# Patient Record
Sex: MALE | URBAN - METROPOLITAN AREA
[De-identification: names, ages, dates, MRNs, and addresses within clinical notes are randomized per-mention and may not be internally consistent; named-entity substitution may affect disease eponyms.]

---

## 2023-03-22 ENCOUNTER — TELEPHONE (OUTPATIENT)
Dept: ENT CLINIC | Age: 43
End: 2023-03-22

## 2023-03-22 NOTE — TELEPHONE ENCOUNTER
LVM informing pt that 3/28 appt needs to be rescheduled due to Jammie Bradford being out of the office.  Asked pt to contact us ASAP

## 2023-03-24 ENCOUNTER — TELEPHONE (OUTPATIENT)
Dept: ENT CLINIC | Age: 43
End: 2023-03-24

## 2023-03-24 NOTE — TELEPHONE ENCOUNTER
LVM informing pt that his 3/28 appt has been cancelled since we were unable to reach him to reschedule the appt, due to Gurvinder Multani being out of the office.      Asked pt to contact our office as soon as possible to reschedule

## 2023-03-29 ENCOUNTER — OFFICE VISIT (OUTPATIENT)
Dept: ENT CLINIC | Age: 43
End: 2023-03-29
Payer: COMMERCIAL

## 2023-03-29 VITALS
SYSTOLIC BLOOD PRESSURE: 128 MMHG | WEIGHT: 204 LBS | DIASTOLIC BLOOD PRESSURE: 80 MMHG | OXYGEN SATURATION: 96 % | HEART RATE: 82 BPM | RESPIRATION RATE: 18 BRPM

## 2023-03-29 DIAGNOSIS — Z98.890 HISTORY OF NASAL SURGERY: ICD-10-CM

## 2023-03-29 DIAGNOSIS — R09.81 NASAL CONGESTION: ICD-10-CM

## 2023-03-29 DIAGNOSIS — J34.3 NASAL TURBINATE HYPERTROPHY: ICD-10-CM

## 2023-03-29 DIAGNOSIS — J34.89 NASAL OBSTRUCTION: Primary | ICD-10-CM

## 2023-03-29 DIAGNOSIS — J34.2 NASAL SEPTAL DEVIATION: ICD-10-CM

## 2023-03-29 PROCEDURE — 99203 OFFICE O/P NEW LOW 30 MIN: CPT | Performed by: OTOLARYNGOLOGY

## 2023-03-29 RX ORDER — FLUTICASONE PROPIONATE 50 MCG
2 SPRAY, SUSPENSION (ML) NASAL DAILY
Qty: 1 EACH | Refills: 1 | Status: SHIPPED | OUTPATIENT
Start: 2023-03-29

## 2023-03-29 NOTE — PROGRESS NOTES
Chief Complaint   Patient presents with    New Patient    Breathing Problem   Has has some reconstructive surgery on jaw and also roto-rooter (nasal) surgery over 10yrs ago      Visit Vitals  /80 (BP 1 Location: Left upper arm, BP Patient Position: Sitting, BP Cuff Size: Adult)   Pulse 82   Resp 18   Wt 204 lb (92.5 kg)   SpO2 96%

## 2023-03-29 NOTE — PROGRESS NOTES
Otolaryngology-Head and Neck Surgery  New Patient Visit     Patient: Erin Leon  YOB: 1980  MRN: [id number]  Date of Service: 3/29/2023    Chief Complaint:   Chief Complaint   Patient presents with    New Patient    Breathing Problem       History of Present Illness: Erin Leon is a 41y.o. year old male who presents today for discussion of nasal obstruction. He has a history of chronic nasal obstruction    As a child had multiple jaw, orthognathic surgeries  Had some sort of nasal surgery or procedure intended to help his nasal breathing    However, has bilateral nasal obstruction, generally at night    Seen by dentist recently and told mouth breathing was contributing to dental issues / dental wear     Past Medical History:  No past medical history on file. Past Surgical History:   No past surgical history on file. Medications:   No current outpatient medications    Allergies:   Not on File    Social History:         Family History:  No family history on file. Review of Systems:    Consitutional: denies fever, excessive weight gain or loss. Eyes: denies diplopia, eye pain. Integumentary: denies new concerning skin lesions. Ears, Nose, Mouth, Throat: denies except as per HPI. Endocrine: denies hot or cold intolerance, increased thirst.  Respiratory: denies cough, hemoptysis, wheezing  Gastrointestinal: denies trouble swallowing, nausea, emesis, regurgitation  Musculoskeletal: denies muscle weakness or wasting  Cardiovascular: denies chest pain, shortness of breath  Neurologic: denies seizures, numbness or tingling, syncope  Hematologic: denies easy bleeding or bruising    Physical Examination:   There were no vitals filed for this visit. General: Comfortable, pleasant, appears stated age  Voice: Strong, speaking in full sentences, no stridor    Face: No masses or lesions, facial strength symmetric   Ears: External ears unremarkable. Bilateral ear canal clear.  Tympanic membrane clear and intact, with visible landmarks. Clear middle ear space  Nose: External nose unremarkable. Dorsum midline. Anterior rhinoscopy demonstrates no lesions. R septal deviation with diffuse nasal turbinate hypertrophy  Oral Cavity / Oropharynx: No trismus. Mucosa pink and moist. No lesions. Tongue is midline and mobile. Palate elevates symmetrically. Uvula midline. Tonsils unremarkable. Base of tongue soft. Floor of mouth soft. Neck: Supple. No adenopathy. Thyroid unremarkable. Palpable laryngeal landmarks. Full neck range of motion   Neurologic: CN II - XI intact. Normal gait      Assessment and Plan:   Nasal obstruction  History of craniofacial surgery  Nasal septal deviation  - He does have an acute URI today so that may be some of the congestion  - Add flonase daily, discussed use  - Check sinus CT given prior craniofacial surgery  - Follow up in 6 weeks  - Pending CT and exam, consider septoplasty, turbinate reduciton           The patient was instructed to return to clinic if no improvement or progression of symptoms. Signs to watch out for reviewed.       Nino Olszewski, MD   [facility]  [address] [place] [address]  Office Phone:

## 2023-05-02 ENCOUNTER — HOSPITAL ENCOUNTER (OUTPATIENT)
Dept: CT IMAGING | Age: 43
Discharge: HOME | End: 2023-05-02
Attending: OTOLARYNGOLOGY
Payer: COMMERCIAL

## 2023-05-02 DIAGNOSIS — Z98.890 HISTORY OF NASAL SURGERY: ICD-10-CM

## 2023-05-02 DIAGNOSIS — J34.89 NASAL OBSTRUCTION: ICD-10-CM

## 2023-05-02 PROCEDURE — 70486 CT MAXILLOFACIAL W/O DYE: CPT

## 2023-05-08 ENCOUNTER — OFFICE VISIT (OUTPATIENT)
Age: 43
End: 2023-05-08
Payer: COMMERCIAL

## 2023-05-08 VITALS
HEART RATE: 105 BPM | OXYGEN SATURATION: 98 % | WEIGHT: 204 LBS | SYSTOLIC BLOOD PRESSURE: 110 MMHG | DIASTOLIC BLOOD PRESSURE: 80 MMHG | BODY MASS INDEX: 27.04 KG/M2 | HEIGHT: 73 IN

## 2023-05-08 DIAGNOSIS — R09.81 NASAL CONGESTION: ICD-10-CM

## 2023-05-08 DIAGNOSIS — J34.3 HYPERTROPHY OF NASAL TURBINATES: ICD-10-CM

## 2023-05-08 DIAGNOSIS — J34.2 DEVIATED NASAL SEPTUM: Primary | ICD-10-CM

## 2023-05-08 PROCEDURE — 99213 OFFICE O/P EST LOW 20 MIN: CPT | Performed by: OTOLARYNGOLOGY

## 2023-05-08 RX ORDER — FLUTICASONE PROPIONATE 50 MCG
2 SPRAY, SUSPENSION (ML) NASAL DAILY
COMMUNITY
Start: 2023-03-29

## 2023-05-08 NOTE — PROGRESS NOTES
facial strength symmetric   Ears: External ears unremarkable. Bilateral ear canal clear. Tympanic membrane clear and intact, with visible landmarks. Clear middle ear space  Nose: External nose unremarkable. Dorsum midline. Anterior rhinoscopy demonstrates no lesions. R septal deviation with diffuse nasal turbinate hypertrophy  Oral Cavity / Oropharynx: No trismus. Mucosa pink and moist. No lesions. Tongue is midline and mobile. Palate elevates symmetrically. Uvula midline. Tonsils unremarkable. Base of tongue soft. Floor of mouth soft. Neck: Supple. No adenopathy. Thyroid unremarkable. Palpable laryngeal landmarks. Full neck range of motion   Neurologic: CN II - XI intact. Normal gait    CT sinus images reviewed with R septal deviation and high posterior R septal spur     Assessment and Plan:   Nasal obstruction  History of craniofacial surgery  Nasal septal deviation  - Reviewed CT images   - Has had partial improvement with flonase but nasal obstruction still bothersome  - Discussed role of septoplasty and turbinate reduction, which he is interested in  - He has some upcoming insurance changes so timing will be dependent on this  - RTC for preop visit, plan scope with preop visit           The patient was instructed to return to clinic if no improvement or progression of symptoms. Signs to watch out for reviewed.       MD [facility]  [address] [place] [address] [address]  [facility]  Office Phone: [phone]

## 2023-05-22 ENCOUNTER — TELEPHONE (OUTPATIENT)
Age: 43
End: 2023-05-22

## 2023-05-22 NOTE — TELEPHONE ENCOUNTER
Contacted patient to see if he would like to schedule surgery with Dr. Yared Roque (Septoplasty and turbinate reduction). Pt stated he does not wish to move forward with scheduling surgery for now. He stated he would like to wait for a few months \"maybe about 3 months or so\" before proceeding with scheduling. I asked if he would like to reach out when he was ready and he stated he would. I informed the pt I would hold onto his info for when he is ready to proceed.

## 2024-11-13 ENCOUNTER — OFFICE VISIT (OUTPATIENT)
Facility: CLINIC | Age: 44
End: 2024-11-13
Payer: COMMERCIAL

## 2024-11-13 VITALS
TEMPERATURE: 98.2 F | OXYGEN SATURATION: 97 % | HEART RATE: 69 BPM | HEIGHT: 73 IN | SYSTOLIC BLOOD PRESSURE: 138 MMHG | WEIGHT: 207 LBS | RESPIRATION RATE: 16 BRPM | BODY MASS INDEX: 27.43 KG/M2 | DIASTOLIC BLOOD PRESSURE: 80 MMHG

## 2024-11-13 DIAGNOSIS — J34.2 DEVIATED NASAL SEPTUM: ICD-10-CM

## 2024-11-13 DIAGNOSIS — Z13.1 DIABETES MELLITUS SCREENING: ICD-10-CM

## 2024-11-13 DIAGNOSIS — M72.2 PLANTAR FASCIITIS: ICD-10-CM

## 2024-11-13 DIAGNOSIS — B07.8 OTHER VIRAL WARTS: ICD-10-CM

## 2024-11-13 DIAGNOSIS — Z13.220 LIPID SCREENING: ICD-10-CM

## 2024-11-13 DIAGNOSIS — R09.81 CHRONIC NASAL CONGESTION: Primary | ICD-10-CM

## 2024-11-13 PROCEDURE — 99204 OFFICE O/P NEW MOD 45 MIN: CPT | Performed by: STUDENT IN AN ORGANIZED HEALTH CARE EDUCATION/TRAINING PROGRAM

## 2024-11-13 SDOH — ECONOMIC STABILITY: FOOD INSECURITY: WITHIN THE PAST 12 MONTHS, THE FOOD YOU BOUGHT JUST DIDN'T LAST AND YOU DIDN'T HAVE MONEY TO GET MORE.: PATIENT DECLINED

## 2024-11-13 SDOH — ECONOMIC STABILITY: FOOD INSECURITY: WITHIN THE PAST 12 MONTHS, YOU WORRIED THAT YOUR FOOD WOULD RUN OUT BEFORE YOU GOT MONEY TO BUY MORE.: PATIENT DECLINED

## 2024-11-13 SDOH — ECONOMIC STABILITY: INCOME INSECURITY: HOW HARD IS IT FOR YOU TO PAY FOR THE VERY BASICS LIKE FOOD, HOUSING, MEDICAL CARE, AND HEATING?: PATIENT DECLINED

## 2024-11-13 ASSESSMENT — PATIENT HEALTH QUESTIONNAIRE - PHQ9
SUM OF ALL RESPONSES TO PHQ QUESTIONS 1-9: 0
1. LITTLE INTEREST OR PLEASURE IN DOING THINGS: NOT AT ALL
SUM OF ALL RESPONSES TO PHQ9 QUESTIONS 1 & 2: 0
SUM OF ALL RESPONSES TO PHQ QUESTIONS 1-9: 0
2. FEELING DOWN, DEPRESSED OR HOPELESS: NOT AT ALL

## 2024-11-13 NOTE — PROGRESS NOTES
\"Have you been to the ER, urgent care clinic since your last visit?  Hospitalized since your last visit?\"    NO    “Have you seen or consulted any other health care providers outside our system since your last visit?”    NO    Chief Complaint   Patient presents with    New Patient     /80 (Site: Right Lower Arm, Position: Sitting, Cuff Size: Large Adult)   Pulse 89   Temp 98.2 °F (36.8 °C) (Temporal)   Resp 16   Ht 1.854 m (6' 1\")   Wt 93.9 kg (207 lb)   SpO2 97%   BMI 27.31 kg/m²

## 2024-11-13 NOTE — PATIENT INSTRUCTIONS
In the age category closest to yours, read across to find your target heart rates. Target heart rate during moderate-intensity activities is about 50-70% of maximum heart rate. During vigorous physical activity, it’s about 70-85% of maximum.

## 2024-11-13 NOTE — PROGRESS NOTES
[facility]  Subjective  Chief Complaint   Patient presents with    New Patient     HPI:  Eligio Del Cid  : 1980    PCP: Lisa Child DO    History of Present Illness  The patient is a 44-year-old male here today to establish care and address several acute concerns.    He has been dealing with plantar fasciitis for over a year. He has been using an insole and a compression sock on one foot, which has provided some relief. He also uses a night brace and performs stretches every three days. Despite these measures, the condition persists. He has been unable to run for over a year due to the pain. He has tried to alleviate the pain by using an elliptical machine at home and stopped running.    He has been dealing with a common wart on his right hand for six months. He has attempted to freeze it off three times without success. He has also tried icing it, but this has not led to any improvement.    He has noticed an increase in his heart rate during exercise. He is seeking guidance on how to manage this. He has been using a smart watch to monitor his heart rate. His resting heart rate has been as low as 51 or 52, but these instances were brief. His average heart rate is around 55 or 56. During exercise, his heart rate can reach between 165 and 175. He is concerned about potential cardiovascular issues.    He had some dental work which impacted his ability to breathe. He could not breathe properly through his nose as a kid. He had surgery at the end of college. He is treating that with occasional doses of Claritin and Flonase spray. He had a mole removed from his back. He had a colonoscopy which came back clean.    He says he has had cancer blood testing done but believes there was a false positive.    Not interested in blood tests    SOCIAL HISTORY  He does not smoke. He drinks alcohol once every 2 weeks. He denies drug use.    FAMILY HISTORY  His father  of mesothelioma. His